# Patient Record
Sex: MALE
[De-identification: names, ages, dates, MRNs, and addresses within clinical notes are randomized per-mention and may not be internally consistent; named-entity substitution may affect disease eponyms.]

---

## 2018-08-06 ENCOUNTER — HOSPITAL ENCOUNTER (INPATIENT)
Dept: HOSPITAL 31 - C.ER | Age: 58
LOS: 3 days | Discharge: HOME | DRG: 541 | End: 2018-08-09
Attending: INTERNAL MEDICINE | Admitting: INTERNAL MEDICINE
Payer: COMMERCIAL

## 2018-08-06 DIAGNOSIS — J18.9: Primary | ICD-10-CM

## 2018-08-06 DIAGNOSIS — Z79.82: ICD-10-CM

## 2018-08-06 DIAGNOSIS — J44.0: ICD-10-CM

## 2018-08-06 DIAGNOSIS — N17.9: ICD-10-CM

## 2018-08-06 DIAGNOSIS — F17.210: ICD-10-CM

## 2018-08-06 DIAGNOSIS — J98.11: ICD-10-CM

## 2018-08-06 DIAGNOSIS — Z59.0: ICD-10-CM

## 2018-08-06 LAB
ALBUMIN SERPL-MCNC: 4.7 G/DL (ref 3.5–5)
ALBUMIN/GLOB SERPL: 1.4 {RATIO} (ref 1–2.1)
ALT SERPL-CCNC: 26 U/L (ref 21–72)
APTT BLD: 33 SECONDS (ref 21–34)
AST SERPL-CCNC: 21 U/L (ref 17–59)
BASOPHILS # BLD AUTO: 0.1 K/UL (ref 0–0.2)
BASOPHILS NFR BLD: 0.7 % (ref 0–2)
BNP SERPL-MCNC: 66.6 PG/ML (ref 0–900)
BUN SERPL-MCNC: 17 MG/DL (ref 9–20)
CALCIUM SERPL-MCNC: 10 MG/DL (ref 8.6–10.4)
CK MB SERPL-MCNC: 1.24 NG/ML (ref 0–3.38)
CK MB SERPL-MCNC: 1.38 NG/ML (ref 0–3.38)
CK MB SERPL-MCNC: 1.59 NG/ML (ref 0–3.38)
EOSINOPHIL # BLD AUTO: 0.1 K/UL (ref 0–0.7)
EOSINOPHIL NFR BLD: 1.1 % (ref 0–4)
ERYTHROCYTE [DISTWIDTH] IN BLOOD BY AUTOMATED COUNT: 13.7 % (ref 11.5–14.5)
GFR NON-AFRICAN AMERICAN: 42
HGB BLD-MCNC: 17 G/DL (ref 12–18)
INR PPP: 1.1
LYMPHOCYTES # BLD AUTO: 1.7 K/UL (ref 1–4.3)
LYMPHOCYTES NFR BLD AUTO: 13.8 % (ref 20–40)
MCH RBC QN AUTO: 30.9 PG (ref 27–31)
MCHC RBC AUTO-ENTMCNC: 34.3 G/DL (ref 33–37)
MCV RBC AUTO: 90.3 FL (ref 80–94)
MONOCYTES # BLD: 0.7 K/UL (ref 0–0.8)
MONOCYTES NFR BLD: 5.7 % (ref 0–10)
NEUTROPHILS # BLD: 9.6 K/UL (ref 1.8–7)
NEUTROPHILS NFR BLD AUTO: 78.7 % (ref 50–75)
NRBC BLD AUTO-RTO: 0.1 % (ref 0–2)
PLATELET # BLD: 248 K/UL (ref 130–400)
PMV BLD AUTO: 8.6 FL (ref 7.2–11.7)
PROTHROMBIN TIME: 11.9 SECONDS (ref 9.7–12.2)
RBC # BLD AUTO: 5.49 MIL/UL (ref 4.4–5.9)
TROPONIN I SERPL-MCNC: 0.04 NG/ML (ref 0–0.12)
TROPONIN I SERPL-MCNC: 0.04 NG/ML (ref 0–0.12)
TROPONIN I SERPL-MCNC: 0.08 NG/ML (ref 0–0.12)
WBC # BLD AUTO: 12.2 K/UL (ref 4.8–10.8)

## 2018-08-06 RX ADMIN — PANTOPRAZOLE SODIUM SCH MG: 40 TABLET, DELAYED RELEASE ORAL at 09:17

## 2018-08-06 RX ADMIN — IPRATROPIUM BROMIDE AND ALBUTEROL SULFATE SCH ML: .5; 3 SOLUTION RESPIRATORY (INHALATION) at 20:37

## 2018-08-06 RX ADMIN — ENOXAPARIN SODIUM SCH MG: 40 INJECTION SUBCUTANEOUS at 09:18

## 2018-08-06 SDOH — ECONOMIC STABILITY - HOUSING INSECURITY: HOMELESSNESS: Z59.0

## 2018-08-06 NOTE — CP.PCM.PN
Subjective





- Date & Time of Evaluation


Date of Evaluation: 08/06/18


Time of Evaluation: 11:00





- Subjective


Subjective: 


clinically same





Objective





- Vital Signs/Intake and Output


Vital Signs (last 24 hours): 


 











Temp Pulse Resp BP Pulse Ox


 


 97.7 F   58 L  20   96/61 L  94 L


 


 08/06/18 15:00  08/06/18 15:59  08/06/18 15:00  08/06/18 15:00  08/06/18 15:00








Intake and Output: 


 











 08/06/18 08/07/18





 18:59 06:59


 


Intake Total 300 


 


Output Total 300 


 


Balance 0 














- Medications


Medications: 


 Current Medications





Albuterol/Ipratropium (Duoneb 3 Mg/0.5 Mg (3 Ml) Ud)  3 ml INH RQ6 [facility]


Aspirin (Aspirin)  325 mg PO DAILY [facility]


   Last Admin: 08/06/18 09:17 Dose:  325 mg


Enoxaparin Sodium (Lovenox)  40 mg SC DAILY [facility]


   Last Admin: 08/06/18 09:18 Dose:  40 mg


Ceftriaxone Sodium 1 gm/ (Sodium Chloride)  100 mls @ 100 mls/hr IVPB DAILY [facility]


   PRN Reason: Protocol


   Last Admin: 08/06/18 09:17 Dose:  100 mls/hr


Azithromycin 500 mg/ Sodium (Chloride)  250 mls @ 250 mls/hr IVPB DAILY [facility]


   PRN Reason: Protocol


   Last Admin: 08/06/18 10:31 Dose:  250 mls/hr


Pantoprazole Sodium (Protonix Ec Tab)  40 mg PO DAILY [facility]


   Last Admin: 08/06/18 09:17 Dose:  40 mg











- Labs


Labs: 


 





 08/06/18 00:29 





 08/06/18 00:29 





 











PT  11.9 SECONDS (9.7-12.2)   08/06/18  00:25    


 


INR  1.1   08/06/18  00:25    


 


APTT  33 SECONDS (21-34)   08/06/18  00:25    














- Constitutional


Appears: Well





- Head Exam


Head Exam: ATRAUMATIC, NORMAL INSPECTION, NORMOCEPHALIC





- Eye Exam


Eye Exam: EOMI, Normal appearance, PERRL


Pupil Exam: NORMAL ACCOMODATION, PERRL





- ENT Exam


ENT Exam: Mucous Membranes Moist, Normal Exam





- Neck Exam


Neck Exam: Full ROM, Normal Inspection.  absent: Lymphadenopathy





- Respiratory Exam


Respiratory Exam: Decreased Breath Sounds





- Cardiovascular Exam


Cardiovascular Exam: REGULAR RHYTHM, +S1, +S2





- GI/Abdominal Exam


GI & Abdominal Exam: Soft, Diminished Bowel Sounds





- Rectal Exam


Rectal Exam: Deferred

## 2018-08-06 NOTE — CP.PCM.HP
Past Patient History





- Past Social History


Smoking Status: Light Smoker < 10 Cigarettes Daily





- MUSCULOSKELETAL/RHEUMATOLOGICAL


Hx Falls: No





- GASTROINTESTINAL


Hx Gall Bladder Disease: Yes (GB SURGERY 19978)





- PSYCHIATRIC


Hx Substance Use: No





- SURGICAL HISTORY


Hx Cholecystectomy: Yes





- ANESTHESIA


Hx Anesthesia: Yes


Hx Anesthesia Reactions: No





Meds


Allergies/Adverse Reactions: 


 Allergies











Allergy/AdvReac Type Severity Reaction Status Date / Time


 


No Known Allergies Allergy   Verified 08/06/18 00:17














Results





- Vital Signs


Recent Vital Signs: 





 Last Vital Signs











Temp  97.4 F L  08/06/18 07:15


 


Pulse  65   08/06/18 07:15


 


Resp  20   08/06/18 07:15


 


BP  111/56 L  08/06/18 07:15


 


Pulse Ox  97   08/06/18 07:15














- Labs


Result Diagrams: 


 08/06/18 00:29





 08/06/18 00:29


Labs: 





 Laboratory Results - last 24 hr











  08/06/18 08/06/18 08/06/18





  00:25 00:29 00:29


 


WBC   12.2 H 


 


RBC   5.49 


 


Hgb   17.0 


 


Hct   49.6 


 


MCV   90.3 


 


MCH   30.9 


 


MCHC   34.3 


 


RDW   13.7 


 


Plt Count   248 


 


MPV   8.6 


 


Neut % (Auto)   78.7 H 


 


Lymph % (Auto)   13.8 L 


 


Mono % (Auto)   5.7 


 


Eos % (Auto)   1.1 


 


Baso % (Auto)   0.7 


 


Neut # (Auto)   9.6 H 


 


Lymph # (Auto)   1.7 


 


Mono # (Auto)   0.7 


 


Eos # (Auto)   0.1 


 


Baso # (Auto)   0.1 


 


PT  11.9  


 


INR  1.1  


 


APTT  33  


 


D-Dimer, Quantitative   


 


Sodium    147


 


Potassium    3.6


 


Chloride    108 H


 


Carbon Dioxide    23


 


Anion Gap    19


 


BUN    17


 


Creatinine    1.7 H


 


Est GFR ( Amer)    50


 


Est GFR (Non-Af Amer)    42


 


Random Glucose    147 H


 


Calcium    10.0


 


Total Bilirubin    0.8


 


AST    21


 


ALT    26


 


Alkaline Phosphatase    95


 


Total Creatine Kinase    118


 


CK-MB (Mass)    1.24


 


Troponin I    0.0440


 


NT-Pro-B Natriuret Pep    66.6


 


Total Protein    8.0


 


Albumin    4.7


 


Globulin    3.3


 


Albumin/Globulin Ratio    1.4














  08/06/18





  01:27


 


WBC 


 


RBC 


 


Hgb 


 


Hct 


 


MCV 


 


MCH 


 


MCHC 


 


RDW 


 


Plt Count 


 


MPV 


 


Neut % (Auto) 


 


Lymph % (Auto) 


 


Mono % (Auto) 


 


Eos % (Auto) 


 


Baso % (Auto) 


 


Neut # (Auto) 


 


Lymph # (Auto) 


 


Mono # (Auto) 


 


Eos # (Auto) 


 


Baso # (Auto) 


 


PT 


 


INR 


 


APTT 


 


D-Dimer, Quantitative  < 200


 


Sodium 


 


Potassium 


 


Chloride 


 


Carbon Dioxide 


 


Anion Gap 


 


BUN 


 


Creatinine 


 


Est GFR ( Amer) 


 


Est GFR (Non-Af Amer) 


 


Random Glucose 


 


Calcium 


 


Total Bilirubin 


 


AST 


 


ALT 


 


Alkaline Phosphatase 


 


Total Creatine Kinase 


 


CK-MB (Mass) 


 


Troponin I 


 


NT-Pro-B Natriuret Pep 


 


Total Protein 


 


Albumin 


 


Globulin 


 


Albumin/Globulin Ratio 














Assessment & Plan





- Assessment and Plan (Free Text)


Plan: 





tomi


romix3


cardio


pulm


rocephin


zithromax


asp


as ordred

## 2018-08-06 NOTE — CT
Date of service: 



08/06/2018



PROCEDURE:  CT Chest with contrast (Pulmonary Angiogram)



HISTORY:

dyspnea, chest pain



COMPARISON:

None available.



TECHNIQUE:

Axial computed tomography images were obtained of the chest in the 

pulmonary arterial phase of enhancement. Coronal and sagittal 

reformatted images were created and reviewed.



Radiation dose:



Total exam DLP = 621 mGy-cm.



This CT exam was performed using one or more of the following dose 

reduction techniques: Automated exposure control, adjustment of the 

mA and/or kV according to patient size, and/or use of iterative 

reconstruction technique.



FINDINGS:



PULMONARY ARTERIES:

No evidence for central pulmonary embolism.  More limited evaluation 

for segmental and subsegmental emboli given motion artifact and 

suboptimal contrast timing bolus. 



AORTA:

No acute findings. No thoracic aortic aneurysm. 



LUNGS:

Atelectasis and or infiltrate at the lung bases.  Paraseptal 

emphysema. 3 millimeter subpleural nodular density along the fissure 

on series 4, image 62. 



PLEURAL SPACES:

Unremarkable. No effusion or pneumothorax. 



HEART:

Coronary calcifications. 



LYMPH NODES:

1.2 centimeter right hilar node.



BONES, CHEST WALL:

Unremarkable. No fracture or destructive lesion 



OTHER FINDINGS:

Calcified granulomas and/or foci in the liver. Cholecystectomy. 

Low-attenuation lesion in the left kidney measuring 3.5 centimeters 

demonstrating a Hounsfield unit attenuation of 6 suggestive for a 

cyst. Additional smaller hypodensities, too small to adequately 

characterize. 



IMPRESSION:

No evidence for central pulmonary embolism. More limited evaluation 

for segmental and subsegmental emboli given motion artifact and 

suboptimal contrast timing bolus. 



Atelectasis and or infiltrate at the lung bases. Paraseptal 

emphysema. 3 millimeter subpleural nodular density along the fissure 

on series 4, image 62.  Clinical correlation. Three month interval 

followup may be helpful if clinically indicated. 



Additional findings as above.



These findings were preliminarily reported at 2:37 a.m. on 08/06/2018 

by Dr. Juan Diego Rodriguez from [facility].

## 2018-08-06 NOTE — CP.PCM.PN
Subjective





- Date & Time of Evaluation


Date of Evaluation: 08/06/18


Time of Evaluation: 07:00





- Subjective


Subjective: 





PGY2- Progress Note for Dr. Gupta





Patient with no significant PMHx presented to the ER for midsternal and left 

sided chest pain which he rated 10/10. Pain lasted for about 30 minutes at that 

level and then decreased. Patient today says the pain is 5/10. Pain does not 

radiate. Patient does admit to some shortness of breath. Patient said he felt 

palpitations yesterday, but not today. Patient denies headache, abdominal pain, 

nausea, vomiting, constipation, or diarrhea. 





PMHx: none


Allergies: NKDA


Psurg: cholecystectomy 28 years ago, left leg surgery 30 years ago, left hand 

surgery 4 years ago


Famhx: Father- prostate CA, brother- leukemia


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


lives in shelter for 1 month, homeless for years 





Objective





- Vital Signs/Intake and Output


Vital Signs (last 24 hours): 


 











Temp Pulse Resp BP Pulse Ox


 


 97.4 F L  62   20   111/56 L  97 


 


 08/06/18 07:15  08/06/18 08:50  08/06/18 07:15  08/06/18 07:15  08/06/18 07:15








Intake and Output: 


 











 08/06/18 08/06/18





 06:59 18:59


 


Intake Total  300


 


Output Total  300


 


Balance  0














- Medications


Medications: 


 Current Medications





Albuterol/Ipratropium (Duoneb 3 Mg/0.5 Mg (3 Ml) Ud)  [facility]


Aspirin (Aspirin)  [facility] PO [facility]ILY [facility]


   Last Admin: 08/06/18 09:17 Dose:  325 mg


Enoxaparin Sodium (Lovenox)  40 mg SC [facility]ILY [facility]


   Last Admin: 08/06/18 09:18 Dose:  40 mg


Ceftriaxone Sodium 1 gm/ (Sodium Chloride)  100 mls @ 100 mls/hr IVPB DAILY [facility]


   PRN Reason: Protocol


   Last Admin: 08/06/18 09:17 Dose:  100 mls/hr


Azithromycin 500 mg/ Sodium (Chloride)  250 mls @ 250 mls/hr IVPB DAILY [facility]


   PRN Reason: Protocol


   Last Admin: 08/06/18 10:31 Dose:  250 mls/hr


Pantoprazole Sodium (Protonix Ec Tab)  40 mg PO DAILY [facility]


   Last Admin: 08/06/18 09:17 Dose:  40 mg











- Labs


Labs: 


 





 08/06/18 00:29 





 08/06/18 00:29 





 











PT  11.9 SECONDS (9.7-12.2)   08/06/18  00:25    


 


INR  1.1   08/06/18  00:25    


 


APTT  33 SECONDS (21-34)   08/06/18  00:25    














- Constitutional


Appears: Non-toxic, No Acute Distress, Unkempt





- Head Exam


Head Exam: ATRAUMATIC, NORMAL INSPECTION, NORMOCEPHALIC





- Eye Exam


Eye Exam: EOMI, Normal appearance





- ENT Exam


ENT Exam: Mucous Membranes Moist





- Respiratory Exam


Respiratory Exam: Rales, NORMAL BREATHING PATTERN





- Cardiovascular Exam


Cardiovascular Exam: REGULAR RHYTHM, RRR, +S1, +S2





- GI/Abdominal Exam


GI & Abdominal Exam: Soft, Normal Bowel Sounds.  absent: Tenderness





- Extremities Exam


Extremities Exam: Normal Inspection.  absent: Pedal Edema, Tenderness





- Back Exam


Back Exam: NORMAL INSPECTION





- Neurological Exam


Neurological Exam: Alert, Awake, Oriented x3





- Psychiatric Exam


Psychiatric exam: Normal Affect, Normal Mood





- Skin


Skin Exam: Intact, Normal Color, Warm





Assessment and Plan





- Assessment and Plan (Free Text)


Assessment: 


Chest Pain


r/o ACS


admit to tele 


Trop I .0440, .0830


f/u 3rd troponin


EKG: no ST or T wave changes


cardiology consult, Dr. Pérez, help appreciated


heart healthy diet 


CTA: no evidence of PE. more limited eval for segmental and subsegmental emboli 

given motion artifact and suboptimal contrast timing bolus. Atelectasis and or 

infiltrate at the lung bases. Paraseptal emphysema. 3mm subpleural nodular 

density along the fissure. Clinical correlation. Three month interval followup 

may be helpful if clinically indicated. 





Pneumonia


as seen on CT


pulm consulted, Dr. Alexandre, help appreciated


WBC: 12.2 


Meds:


Azithromycin 500mg ivpb daily


Ceftriaxone 1 gm ivpb daily


Duonebs 3ml INH q6h





Lung Nodule 


as seen on CT


will need follow up CT as an outpatient


pulm consulted, Dr. Alexandre, help appreciated 





Prophylaxis


Protonix 40mg po daily


Lovenox 40mg sc daily, SCDs





Discussed with Dr. Gupta

## 2018-08-06 NOTE — C.PDOC
History Of Present Illness


58 y/o M c no PMHx p/w chest pain x 2 hours. Pain is midchest, radiating 

towards right, sharp, intermittent, worsened by deep breath, associated with 

shortness of breath, started while patient was working as a . Denies 

fever, cough, trauma, travel, leg swelling, nausea, vomiting.


Time Seen by Provider: 08/06/18 00:25


Chief Complaint (Nursing): Chest Pain





Past Medical History


Vital Signs: 


 Last Vital Signs











Temp  98.5 F   08/06/18 00:13


 


Pulse  81   08/06/18 01:36


 


Resp  13   08/06/18 01:36


 


BP  107/69   08/06/18 01:36


 


Pulse Ox  97   08/06/18 01:36














- Medical History


PMH: Gall Bladder Disease


Surgical History: Cholecystectomy


Family History: States: No Known Family Hx





- Social History


Hx Alcohol Use: No


Hx Substance Use: No





- Immunization History


Hx Tetanus Toxoid Vaccination: Yes


Hx Influenza Vaccination: Yes


Hx Pneumococcal Vaccination: Yes





Review Of Systems


Except As Marked, All Systems Reviewed And Found Negative.


Constitutional: Negative for: Fever


Gastrointestinal: Negative for: Vomiting





Physical Exam





- Physical Exam


Additional Physical Exam Comments: 





Gen: NAD


Head: NC/AT


Eyes: PERRL


ENT: MMM


Neck: Supple


Chest: No tenderness


CV: Borderline tachycardic


Lungs: CTA b/l. Pulse oximetry 91% on room air


Abd: Soft, NT


Back: No CVA tenderness


Extremities: No edema


Skin: No rash. Diaphoretic.


Neuro: Alert, no focal deficit





ED Course And Treatment





- Laboratory Results


Result Diagrams: 


 08/06/18 00:29





 08/06/18 00:29


O2 Sat by Pulse Oximetry: 92





Medical Decision Making


Medical Decision Making: 


Differential includes MSK pain, PE, ACS. 





FINDINGS:


Pulmonary arteries: Normal. No pulmonary emboli.


Aorta: Normal. No aortic aneurysm. No aortic dissection.


Lungs: Atelectasis or infiltrate in the bases. Paraseptal emphysema


Pleural space: Normal. No pneumothorax. No pleural effusion.


Heart: Coronary artery calcifications.


Bones/joints: Unremarkable. No acute fracture.


Soft tissues: Unremarkable.


Lymph nodes: Unremarkable. No enlarged lymph nodes.


Liver: Calcified granulomas in the liver.


Gallbladder and bile ducts: Cholecystectomy.


Kidneys and ureters: Low-density structures in the left kidney are not further 

characterized.





Troponin negative at 0.04. Will trend.





Antibiotics administered.





Disposition





- Disposition


Disposition: HOSPITALIZED


Disposition Time: 02:38


Condition: FAIR


Forms:  [facility] (English)





- POA


Core Measure Indicators: Pneumonia





- Clinical Impression


Clinical Impression: 


 Chest pain, Pneumonia

## 2018-08-06 NOTE — CP.PCM.CON
History of Present Illness





- History of Present Illness


History of Present Illness: 





CHART REVIEWED , PT SEEN AND EXAMINED


56 YO W MALE WITH A HX PUD, SMOKING, ?COPD, ADM 8/6/18 WITH INCREASED ANT MOD 

CP X 20 MIN NONRADIATING WHILE AT WORK, COLLECTING GARBAGE., +COUGH +WHITE 

SPUTUM X 5 DAYS. NO WHEEZE., STILL SMOKING. NO N/V. NO DIAPHORESIS. QUIT 

COCAINE YRS AGO. ON NO MEDS. 





Review of Systems





- Review of Systems


All systems: reviewed and no additional remarkable complaints except





- Constitutional


Constitutional: absent: Chills, Excessive Sweating





- EENT


Eyes: absent: Change in Vision


Ears: absent: Dizziness


Nose/Mouth/Throat: absent: Nasal Congestion





- Cardiovascular


Cardiovascular: Chest Pain.  absent: Leg Edema





- Respiratory


Respiratory: Cough, Dyspnea.  absent: Excessive Mucous Production





- Gastrointestinal


Gastrointestinal: Heartburn.  absent: Nausea, Vomiting





- Genitourinary


Genitourinary: absent: Difficulty Urinating





- Musculoskeletal


Musculoskeletal: absent: Arthralgias





- Integumentary


Integumentary: absent: Rash





- Neurological


Neurological: absent: Confusion, Focal Weakness





- Psychiatric


Psychiatric: absent: Anxiety





- Endocrine


Endocrine: absent: Palpitations





- Hematologic/Lymphatic


Hematologic: absent: Easy Bruising





Past Patient History





- Infectious Disease


Hx of Infectious Diseases: None





- Past Medical History & Family History


Past Medical History?: No


Past Family History: Reviewed and not pertinent





- Past Social History


Smoking Status: Light Smoker < 10 Cigarettes Daily


Chewing Tobacco Use: No


Cigar Use: No


Alcohol: None


Drugs: Cocaine





- CARDIAC


Hx Cardiac Disorders: No





- PULMONARY


Hx Respiratory Disorders: No





- NEUROLOGICAL


Hx Neurological Disorder: No





- HEENT


Hx HEENT Problems: No





- RENAL


Hx Chronic Kidney Disease: No





- ENDOCRINE/METABOLIC


Hx Endocrine Disorders: No





- HEMATOLOGICAL/ONCOLOGICAL


Hx Blood Disorders: No





- INTEGUMENTARY


Hx Dermatological Problems: No





- MUSCULOSKELETAL/RHEUMATOLOGICAL


Hx Musculoskeletal Disorders: No


Hx Falls: No





- GASTROINTESTINAL


Hx Gall Bladder Disease: Yes (GB SURGERY 19978)


Hx Gastritis: Yes





- GENITOURINARY/GYNECOLOGICAL


Hx Genitourinary Disorders: No





- PSYCHIATRIC


Hx Psychophysiologic Disorder: No


Hx Substance Use: No





- SURGICAL HISTORY


Hx Cholecystectomy: Yes





- ANESTHESIA


Hx Anesthesia: Yes


Hx Anesthesia Reactions: No





Meds


Allergies/Adverse Reactions: 


 Allergies











Allergy/AdvReac Type Severity Reaction Status Date / Time


 


No Known Allergies Allergy   Verified 08/06/18 00:17














- Medications


Medications: 


 Current Medications





Albuterol/Ipratropium (Duoneb 3 Mg/0.5 Mg (3 Ml) Ud)  3 ml INH RQ6 [facility]


Aspirin (Aspirin)  325 mg PO DAILY [facility]


Enoxaparin Sodium (Lovenox)  40 mg SC DAILY [facility]


Ceftriaxone Sodium 1 gm/ (Sodium Chloride)  100 mls @ 100 mls/hr IVPB DAILY [facility]


   PRN Reason: Protocol


Azithromycin 500 mg/ Sodium (Chloride)  250 mls @ 250 mls/hr IVPB DAILY [facility]


   PRN Reason: Protocol


Ceftriaxone Sodium 1 gm/ (Sodium Chloride)  100 mls @ 100 mls/hr IVPB DAILY [facility]


   PRN Reason: Protocol


Pantoprazole Sodium (Protonix Ec Tab)  40 mg PO DAILY [facility]











Physical Exam





- Constitutional


Appears: No Acute Distress





- Head Exam


Head Exam: ATRAUMATIC, NORMOCEPHALIC





- Eye Exam


Eye Exam: EOMI, Normal appearance





- ENT Exam


ENT Exam: Mucous Membranes Moist





- Respiratory Exam


Respiratory Exam: Decreased Breath Sounds.  absent: Accessory Muscle Use, Chest 

Wall Tenderness, Wheezes





- Cardiovascular Exam


Cardiovascular Exam: RRR, +S1, +S2





- GI/Abdominal Exam


GI & Abdominal Exam: Soft.  absent: Tenderness





- Rectal Exam


Rectal Exam: Deferred





- Extremities Exam


Extremities exam: Negative for: calf tenderness, pedal edema





- Back Exam


Back exam: absent: CVA tenderness (L), CVA tenderness (R)





- Neurological Exam


Neurological exam: Alert, CN II-XII Intact, Oriented x3





- Psychiatric Exam


Psychiatric exam: Normal Mood





Results





- Vital Signs


Recent Vital Signs: 


 Last Vital Signs











Temp  97.4 F L  08/06/18 07:15


 


Pulse  65   08/06/18 07:15


 


Resp  20   08/06/18 07:15


 


BP  111/56 L  08/06/18 07:15


 


Pulse Ox  97   08/06/18 07:15














- Labs


Result Diagrams: 


 08/06/18 00:29





 08/06/18 00:29


Labs: 


 Laboratory Results - last 24 hr











  08/06/18 08/06/18 08/06/18





  00:25 00:29 00:29


 


WBC   12.2 H 


 


RBC   5.49 


 


Hgb   17.0 


 


Hct   49.6 


 


MCV   90.3 


 


MCH   30.9 


 


MCHC   34.3 


 


RDW   13.7 


 


Plt Count   248 


 


MPV   8.6 


 


Neut % (Auto)   78.7 H 


 


Lymph % (Auto)   13.8 L 


 


Mono % (Auto)   5.7 


 


Eos % (Auto)   1.1 


 


Baso % (Auto)   0.7 


 


Neut # (Auto)   9.6 H 


 


Lymph # (Auto)   1.7 


 


Mono # (Auto)   0.7 


 


Eos # (Auto)   0.1 


 


Baso # (Auto)   0.1 


 


PT  11.9  


 


INR  1.1  


 


APTT  33  


 


D-Dimer, Quantitative   


 


Sodium    147


 


Potassium    3.6


 


Chloride    108 H


 


Carbon Dioxide    23


 


Anion Gap    19


 


BUN    17


 


Creatinine    1.7 H


 


Est GFR ( Amer)    50


 


Est GFR (Non-Af Amer)    42


 


Random Glucose    147 H


 


Calcium    10.0


 


Total Bilirubin    0.8


 


AST    21


 


ALT    26


 


Alkaline Phosphatase    95


 


Total Creatine Kinase    118


 


CK-MB (Mass)    1.24


 


Troponin I    0.0440


 


NT-Pro-B Natriuret Pep    66.6


 


Total Protein    8.0


 


Albumin    4.7


 


Globulin    3.3


 


Albumin/Globulin Ratio    1.4














  08/06/18





  01:27


 


WBC 


 


RBC 


 


Hgb 


 


Hct 


 


MCV 


 


MCH 


 


MCHC 


 


RDW 


 


Plt Count 


 


MPV 


 


Neut % (Auto) 


 


Lymph % (Auto) 


 


Mono % (Auto) 


 


Eos % (Auto) 


 


Baso % (Auto) 


 


Neut # (Auto) 


 


Lymph # (Auto) 


 


Mono # (Auto) 


 


Eos # (Auto) 


 


Baso # (Auto) 


 


PT 


 


INR 


 


APTT 


 


D-Dimer, Quantitative  < 200


 


Sodium 


 


Potassium 


 


Chloride 


 


Carbon Dioxide 


 


Anion Gap 


 


BUN 


 


Creatinine 


 


Est GFR ( Amer) 


 


Est GFR (Non-Af Amer) 


 


Random Glucose 


 


Calcium 


 


Total Bilirubin 


 


AST 


 


ALT 


 


Alkaline Phosphatase 


 


Total Creatine Kinase 


 


CK-MB (Mass) 


 


Troponin I 


 


NT-Pro-B Natriuret Pep 


 


Total Protein 


 


Albumin 


 


Globulin 


 


Albumin/Globulin Ratio 














Assessment & Plan


(1) Acute kidney injury


Status: Acute   





(2) Smoking


Status: Acute   





(3) Chest pain


Status: Acute   





(4) Pneumonia


Status: Acute   





- Assessment and Plan (Free Text)


Assessment: 





56 YO MALE WITH A HX SMOKING, ADM WITH ?UNSTABLE ANGINA R/O ACS WITH DYSPNEA, ?

COPD EXAC AND PNA. CONT EMPIRIC AB., NEB BD., PULM TOILET., MONITOR O2 SAT. CTA 

REVIEWED, NEG PE +BIBASILAR INFILT. MONITOR CARD ENZ, FOR ECHO. CARDIO EVAL. 

SMOKING CESSATION. GI/DVT PROPHYLAXIS. DISCUSSED WITH STAFF.

## 2018-08-07 LAB
ALBUMIN SERPL-MCNC: 3.5 G/DL (ref 3.5–5)
ALBUMIN/GLOB SERPL: 1.4 {RATIO} (ref 1–2.1)
ALT SERPL-CCNC: 21 U/L (ref 21–72)
AST SERPL-CCNC: 15 U/L (ref 17–59)
BASOPHILS # BLD AUTO: 0 K/UL (ref 0–0.2)
BASOPHILS NFR BLD: 0.6 % (ref 0–2)
BUN SERPL-MCNC: 16 MG/DL (ref 9–20)
CALCIUM SERPL-MCNC: 8.3 MG/DL (ref 8.6–10.4)
CK MB SERPL-MCNC: 0.94 NG/ML (ref 0–3.38)
EOSINOPHIL # BLD AUTO: 0.2 K/UL (ref 0–0.7)
EOSINOPHIL NFR BLD: 3.1 % (ref 0–4)
ERYTHROCYTE [DISTWIDTH] IN BLOOD BY AUTOMATED COUNT: 13.6 % (ref 11.5–14.5)
GFR NON-AFRICAN AMERICAN: > 60
HGB BLD-MCNC: 14.4 G/DL (ref 12–18)
LYMPHOCYTES # BLD AUTO: 2.5 K/UL (ref 1–4.3)
LYMPHOCYTES NFR BLD AUTO: 37.3 % (ref 20–40)
MCH RBC QN AUTO: 30.7 PG (ref 27–31)
MCHC RBC AUTO-ENTMCNC: 33.9 G/DL (ref 33–37)
MCV RBC AUTO: 90.6 FL (ref 80–94)
MONOCYTES # BLD: 0.5 K/UL (ref 0–0.8)
MONOCYTES NFR BLD: 7.5 % (ref 0–10)
NEUTROPHILS # BLD: 3.4 K/UL (ref 1.8–7)
NEUTROPHILS NFR BLD AUTO: 51.5 % (ref 50–75)
NRBC BLD AUTO-RTO: 0.1 % (ref 0–2)
PLATELET # BLD: 168 K/UL (ref 130–400)
PMV BLD AUTO: 8.5 FL (ref 7.2–11.7)
RBC # BLD AUTO: 4.69 MIL/UL (ref 4.4–5.9)
WBC # BLD AUTO: 6.7 K/UL (ref 4.8–10.8)

## 2018-08-07 RX ADMIN — IPRATROPIUM BROMIDE AND ALBUTEROL SULFATE SCH ML: .5; 3 SOLUTION RESPIRATORY (INHALATION) at 13:38

## 2018-08-07 RX ADMIN — IPRATROPIUM BROMIDE AND ALBUTEROL SULFATE SCH ML: .5; 3 SOLUTION RESPIRATORY (INHALATION) at 20:30

## 2018-08-07 RX ADMIN — IPRATROPIUM BROMIDE AND ALBUTEROL SULFATE SCH: .5; 3 SOLUTION RESPIRATORY (INHALATION) at 03:04

## 2018-08-07 RX ADMIN — ENOXAPARIN SODIUM SCH MG: 40 INJECTION SUBCUTANEOUS at 09:24

## 2018-08-07 RX ADMIN — IPRATROPIUM BROMIDE AND ALBUTEROL SULFATE SCH ML: .5; 3 SOLUTION RESPIRATORY (INHALATION) at 07:35

## 2018-08-07 RX ADMIN — PANTOPRAZOLE SODIUM SCH MG: 40 TABLET, DELAYED RELEASE ORAL at 09:24

## 2018-08-07 NOTE — CP.PCM.PN
Subjective





- Date & Time of Evaluation


Date of Evaluation: 08/07/18


Time of Evaluation: 19:25





- Subjective


Subjective: 





PGY2- Progress Note for Dr. Gupta





Patient seen and examined at bedside today. Per nursing, no acute events 

occurred overnight. Patient reports feeling better upon examination. Patient no 

longer reports chest pain .Patient denies any shortness of breath, headache, 

dizziness, changes in vision, syncopal episodes, or any other complaints.





Objective





- Vital Signs/Intake and Output


Vital Signs (last 24 hours): 


 











Temp Pulse Resp BP Pulse Ox


 


 97.9 F   58 L  20   112/73   96 


 


 08/07/18 15:29  08/07/18 16:15  08/07/18 15:29  08/07/18 15:29  08/07/18 15:29








Intake and Output: 


 











 08/07/18 08/08/18





 18:59 06:59


 


Intake Total 200 


 


Output Total 220 


 


Balance -20 














- Medications


Medications: 


 Current Medications





Albuterol/Ipratropium (Duoneb 3 Mg/0.5 Mg (3 Ml) Ud)  3 ml INH RQ6 [facility]


   Last Admin: 08/07/18 13:38 Dose:  3 ml


Aspirin (Aspirin)  325 mg PO DAILY [facility]


   Last Admin: 08/07/18 09:24 Dose:  325 mg


Enoxaparin Sodium (Lovenox)  40 mg SC DAILY [facility]


   Last Admin: 08/07/18 09:24 Dose:  40 mg


Ceftriaxone Sodium 1 gm/ (Sodium Chloride)  100 mls @ 100 mls/hr IVPB DAILY [facility]


   PRN Reason: Protocol


   Last Admin: 08/07/18 09:23 Dose:  100 mls/hr


Azithromycin 500 mg/ Sodium (Chloride)  250 mls @ 250 mls/hr IVPB DAILY [facility]


   PRN Reason: Protocol


   Last Admin: 08/07/18 10:33 Dose:  250 mls/hr


Pantoprazole Sodium (Protonix Ec Tab)  40 mg PO DAILY [facility]


   Last Admin: 08/07/18 09:24 Dose:  40 mg











- Labs


Labs: 


 





 08/07/18 07:18 





 08/07/18 07:18 





 











PT  11.9 SECONDS (9.7-12.2)   08/06/18  00:25    


 


INR  1.1   08/06/18  00:25    


 


APTT  33 SECONDS (21-34)   08/06/18  00:25    














- Head Exam


Head Exam: ATRAUMATIC, NORMAL INSPECTION, NORMOCEPHALIC





- Eye Exam


Eye Exam: EOMI, Normal appearance, PERRL.  absent: Periorbital tenderness


Pupil Exam: NORMAL ACCOMODATION, PERRL





- ENT Exam


ENT Exam: Mucous Membranes Moist, Normal Oropharynx





- Respiratory Exam


Respiratory Exam: Clear to Ausculation Bilateral, NORMAL BREATHING PATTERN.  

absent: Prolonged Expiratory Phase, Respiratory Distress





- Cardiovascular Exam


Cardiovascular Exam: REGULAR RHYTHM, +S1, +S2





- GI/Abdominal Exam


GI & Abdominal Exam: Soft, Normal Bowel Sounds.  absent: Hyperactive Bowel 

Sounds





- Extremities Exam


Extremities Exam: Full ROM, Normal Inspection.  absent: Pedal Edema





- Back Exam


Back Exam: NORMAL INSPECTION.  absent: CVA tenderness (R), paraspinal tenderness





- Neurological Exam


Neurological Exam: Alert, Awake





- Psychiatric Exam


Psychiatric exam: Normal Affect, Normal Mood





- Skin


Skin Exam: Dry, Intact





Assessment and Plan





- Assessment and Plan (Free Text)


Plan: 





r/o ACS


admit to tele 


Trop I .0440, .0830, .03


f/u 3rd troponin


EKG: no ST or T wave changes


UDS: negative


cardiology consult, Dr. Pérez, help appreciated


heart healthy diet 


Echocardiogram taken. Will f/u with final read.


CTA: no evidence of PE. more limited eval for segmental and subsegmental emboli 

given motion artifact and suboptimal contrast timing bolus. Atelectasis and or 

infiltrate at the lung bases. Paraseptal emphysema. 3mm subpleural nodular 

density along the fissure. Clinical correlation. Three month interval followup 

may be helpful if clinically indicated. 


Meds:


Aspirin 325mg PO Daily





Pneumonia


as seen on CT


pulm consulted, Dr. Alexandre, help appreciated


   :RESP STATUS NO SIG CHANGE. CONT EMPIRIC AB., CONT NEB BD., MONITOR O2 SAT. 

CT CHEST REVIEWED., CARDIO W/U IN PROGRESS. INCREASE                       :

OOB. DISCUSSED WITH STAFF. 


WBC: 12.2 upon admission


Blood cultures x24 hrs (Preliminary)


Meds:


Azithromycin 500mg ivpb daily


Ceftriaxone 1 gm ivpb daily


Duonebs 3ml INH q6h





Lung Nodule 


as seen on CT


will need follow up CT as an outpatient


pulm consulted, Dr. Alexandre, help appreciated 





Prophylaxis


Protonix 40mg po daily


Lovenox 40mg sc daily, SCDs





Discussed with Dr. Gupta

## 2018-08-07 NOTE — CP.PCM.PN
Subjective





- Date & Time of Evaluation


Date of Evaluation: 08/07/18


Time of Evaluation: 11:40





- Subjective


Subjective: 


clinically same





Objective





- Vital Signs/Intake and Output


Vital Signs (last 24 hours): 


 











Temp Pulse Resp BP Pulse Ox


 


 97.9 F   58 L  20   112/73   96 


 


 08/07/18 15:29  08/07/18 16:15  08/07/18 15:29  08/07/18 15:29  08/07/18 15:29








Intake and Output: 


 











 08/07/18 08/08/18





 18:59 06:59


 


Intake Total 200 


 


Output Total 220 


 


Balance -20 














- Medications


Medications: 


 Current Medications





Albuterol/Ipratropium (Duoneb 3 Mg/0.5 Mg (3 Ml) Ud)  [facility]


   Last Admin: 08/07/18 20:30 Dose:  3 ml


Aspirin (Aspirin)  325 mg PO [facility]ILY [facility]


   Last Admin: 08/07/18 09:24 Dose:  325 mg


Enoxaparin Sodium (Lovenox)  40 mg SC [facility]ILY [facility]


   Last Admin: 08/07/18 09:24 Dose:  40 mg


Ceftriaxone Sodium 1 gm/ (Sodium Chloride)  100 mls @ 100 mls/hr IVPB [facility]


   PRN Reason: Protocol


   Last Admin: 08/07/18 09:23 Dose:  100 mls/hr


Azithromycin 500 mg/ Sodium (Chloride)  250 mls @ 250 mls/hr IVPB [facility]


   PRN Reason: Protocol


   Last Admin: 08/07/18 10:33 Dose:  250 mls/hr


Pantoprazole Sodium (Protonix Ec Tab)  40 mg PO [facility]ILY [facility]


   Last Admin: 08/07/18 09:24 Dose:  40 mg











- Labs


Labs: 


 





 08/07/18 07:18 





 08/07/18 07:18 





 











PT  11.9 SECONDS (9.7-12.2)   08/06/18  00:25    


 


INR  1.1   08/06/18  00:25    


 


APTT  33 SECONDS (21-34)   08/06/18  00:25    














- Constitutional


Appears: Well





- Head Exam


Head Exam: ATRAUMATIC, NORMAL INSPECTION, NORMOCEPHALIC





- Eye Exam


Eye Exam: EOMI, Normal appearance, PERRL


Pupil Exam: NORMAL ACCOMODATION, PERRL





- ENT Exam


ENT Exam: Mucous Membranes Moist, Normal Exam





- Neck Exam


Neck Exam: Full ROM, Normal Inspection.  absent: Lymphadenopathy





- Respiratory Exam


Respiratory Exam: Decreased Breath Sounds





- Cardiovascular Exam


Cardiovascular Exam: REGULAR RHYTHM, +S1, +S2





- GI/Abdominal Exam


GI & Abdominal Exam: Soft, Diminished Bowel Sounds





- Rectal Exam


Rectal Exam: Deferred

## 2018-08-07 NOTE — CP.PCM.PN
Subjective





- Date & Time of Evaluation


Date of Evaluation: 08/07/18


Time of Evaluation: 10:05





- Subjective


Subjective: 





PT ALERT, +COUGH, +YELLOW SPUTUM., STILL INTERMITTENT  ANT. CP AT REST.  ROS; 

OTHERWISE NEG. 





Objective





- Vital Signs/Intake and Output


Vital Signs (last 24 hours): 


 











Temp Pulse Resp BP Pulse Ox


 


 97.7 F   55 L  18   115/73   100 


 


 08/07/18 07:20  08/07/18 07:20  08/07/18 07:20  08/07/18 07:20  08/07/18 07:20








Intake and Output: 


 











 08/07/18 08/07/18





 06:59 18:59


 


Intake Total  200


 


Output Total  220


 


Balance  -20














- Medications


Medications: 


 Current Medications





Albuterol/Ipratropium (Duoneb 3 Mg/0.5 Mg (3 Ml) Ud)  3 ml INH RQ6 [facility]


   Last Admin: 08/07/18 07:35 Dose:  3 ml


Aspirin (Aspirin)  325 mg PO DAILY [facility]


   Last Admin: 08/07/18 09:24 Dose:  325 mg


Enoxaparin Sodium (Lovenox)  40 mg SC DAILY [facility]


   Last Admin: 08/07/18 09:24 Dose:  40 mg


Ceftriaxone Sodium 1 gm/ (Sodium Chloride)  100 mls @ 100 mls/hr IVPB DAILY [facility]


   PRN Reason: Protocol


   Last Admin: 08/07/18 09:23 Dose:  100 mls/hr


Azithromycin 500 mg/ Sodium (Chloride)  250 mls @ 250 mls/hr IVPB DAILY [facility]


   PRN Reason: Protocol


   Last Admin: 08/06/18 10:31 Dose:  250 mls/hr


Pantoprazole Sodium (Protonix Ec Tab)  40 mg PO DAILY [facility]


   Last Admin: 08/07/18 09:24 Dose:  40 mg











- Labs


Labs: 


 





 08/07/18 07:18 





 08/07/18 07:18 





 











PT  11.9 SECONDS (9.7-12.2)   08/06/18  00:25    


 


INR  1.1   08/06/18  00:25    


 


APTT  33 SECONDS (21-34)   08/06/18  00:25    














- Constitutional


Appears: Non-toxic, No Acute Distress





- Head Exam


Head Exam: ATRAUMATIC, NORMOCEPHALIC





- Eye Exam


Eye Exam: EOMI, Normal appearance





- ENT Exam


ENT Exam: Mucous Membranes Moist





- Neck Exam


Neck Exam: Normal Inspection





- Respiratory Exam


Respiratory Exam: Decreased Breath Sounds.  absent: Accessory Muscle Use, 

Wheezes, Respiratory Distress





- Cardiovascular Exam


Cardiovascular Exam: RRR, +S1, +S2





- GI/Abdominal Exam


GI & Abdominal Exam: Soft.  absent: Tenderness





- Rectal Exam


Rectal Exam: Deferred





- Extremities Exam


Extremities Exam: absent: Calf Tenderness, Pedal Edema





- Back Exam


Back Exam: absent: CVA tenderness (L), CVA tenderness (R)





- Neurological Exam


Neurological Exam: Alert, Awake, CN II-XII Intact, Oriented x3





- Psychiatric Exam


Psychiatric exam: Normal Mood





- Skin


Skin Exam: absent: Rash





Assessment and Plan


(1) Acute kidney injury


Status: Acute   





(2) Smoking


Status: Acute   





(3) Chest pain


Status: Acute   





(4) Pneumonia


Status: Acute   





- Assessment and Plan (Free Text)


Assessment: 





RESP STATUS NO SIG CHANGE. CONT EMPIRIC AB., CONT NEB BD., MONITOR O2 SAT. CT 

CHEST REVIEWED., CARDIO W/U IN PROGRESS. INCREASE OOB. DISCUSSED WITH STAFF.

## 2018-08-08 LAB
ALBUMIN SERPL-MCNC: 3.7 G/DL (ref 3.5–5)
ALBUMIN/GLOB SERPL: 1.3 {RATIO} (ref 1–2.1)
ALT SERPL-CCNC: 23 U/L (ref 21–72)
AST SERPL-CCNC: 19 U/L (ref 17–59)
BASOPHILS # BLD AUTO: 0 K/UL (ref 0–0.2)
BASOPHILS NFR BLD: 0.4 % (ref 0–2)
BUN SERPL-MCNC: 16 MG/DL (ref 9–20)
CALCIUM SERPL-MCNC: 8.7 MG/DL (ref 8.6–10.4)
EOSINOPHIL # BLD AUTO: 0.2 K/UL (ref 0–0.7)
EOSINOPHIL NFR BLD: 2.7 % (ref 0–4)
ERYTHROCYTE [DISTWIDTH] IN BLOOD BY AUTOMATED COUNT: 13.4 % (ref 11.5–14.5)
GFR NON-AFRICAN AMERICAN: 57
HGB BLD-MCNC: 14.9 G/DL (ref 12–18)
LYMPHOCYTES # BLD AUTO: 2.1 K/UL (ref 1–4.3)
LYMPHOCYTES NFR BLD AUTO: 31 % (ref 20–40)
MCH RBC QN AUTO: 31.5 PG (ref 27–31)
MCHC RBC AUTO-ENTMCNC: 34.7 G/DL (ref 33–37)
MCV RBC AUTO: 90.8 FL (ref 80–94)
MONOCYTES # BLD: 0.5 K/UL (ref 0–0.8)
MONOCYTES NFR BLD: 7.5 % (ref 0–10)
NEUTROPHILS # BLD: 3.9 K/UL (ref 1.8–7)
NEUTROPHILS NFR BLD AUTO: 58.4 % (ref 50–75)
NRBC BLD AUTO-RTO: 0.1 % (ref 0–2)
PLATELET # BLD: 169 K/UL (ref 130–400)
PMV BLD AUTO: 8.6 FL (ref 7.2–11.7)
RBC # BLD AUTO: 4.72 MIL/UL (ref 4.4–5.9)
WBC # BLD AUTO: 6.7 K/UL (ref 4.8–10.8)

## 2018-08-08 RX ADMIN — IPRATROPIUM BROMIDE AND ALBUTEROL SULFATE SCH ML: .5; 3 SOLUTION RESPIRATORY (INHALATION) at 01:15

## 2018-08-08 RX ADMIN — ENOXAPARIN SODIUM SCH MG: 40 INJECTION SUBCUTANEOUS at 09:20

## 2018-08-08 RX ADMIN — IPRATROPIUM BROMIDE AND ALBUTEROL SULFATE SCH ML: .5; 3 SOLUTION RESPIRATORY (INHALATION) at 13:34

## 2018-08-08 RX ADMIN — IPRATROPIUM BROMIDE AND ALBUTEROL SULFATE SCH ML: .5; 3 SOLUTION RESPIRATORY (INHALATION) at 19:38

## 2018-08-08 RX ADMIN — PANTOPRAZOLE SODIUM SCH MG: 40 TABLET, DELAYED RELEASE ORAL at 09:20

## 2018-08-08 RX ADMIN — IPRATROPIUM BROMIDE AND ALBUTEROL SULFATE SCH ML: .5; 3 SOLUTION RESPIRATORY (INHALATION) at 07:29

## 2018-08-08 NOTE — CP.PCM.PN
Subjective





- Date & Time of Evaluation


Date of Evaluation: 08/08/18


Time of Evaluation: 07:00





- Subjective


Subjective: 





PGY2- Progress Note for Dr. Gupta





Patient seen and examined at bedside. Patient says he still has mild left sided 

chest pain. Patient denies any shortness of breath, headache, dizziness, 

changes in vision, syncopal episodes, or any other complaints.





Objective





- Vital Signs/Intake and Output


Vital Signs (last 24 hours): 


 











Temp Pulse Resp BP Pulse Ox


 


 97.6 F   59 L  20   113/71   98 


 


 08/08/18 07:20  08/08/18 07:55  08/08/18 07:20  08/08/18 07:20  08/08/18 07:20











- Medications


Medications: 


 Current Medications





Albuterol/Ipratropium (Duoneb 3 Mg/0.5 Mg (3 Ml) Ud)  3 ml INH RQ6 [facility]


   Last Admin: 08/08/18 07:29 Dose:  3 ml


Aspirin (Aspirin)  325 mg PO DAILY [facility]


   Last Admin: 08/08/18 09:20 Dose:  325 mg


Enoxaparin Sodium (Lovenox)  40 mg SC DAILY [facility]


   Last Admin: 08/08/18 09:20 Dose:  40 mg


Ceftriaxone Sodium 1 gm/ (Sodium Chloride)  100 mls @ 100 mls/hr IVPB DAILY [facility]


   PRN Reason: Protocol


   Last Admin: 08/08/18 09:20 Dose:  100 mls/hr


Azithromycin 500 mg/ Sodium (Chloride)  250 mls @ 250 mls/hr IVPB DAILY [facility]


   PRN Reason: Protocol


   Last Admin: 08/07/18 10:33 Dose:  250 mls/hr


Pantoprazole Sodium (Protonix Ec Tab)  40 mg PO DAILY [facility]


   Last Admin: 08/08/18 09:20 Dose:  40 mg











- Labs


Labs: 


 





 08/08/18 07:41 





 08/08/18 07:41 





 











PT  11.9 SECONDS (9.7-12.2)   08/06/18  00:25    


 


INR  1.1   08/06/18  00:25    


 


APTT  33 SECONDS (21-34)   08/06/18  00:25    














- Additional Findings


Additional findings: 


- Head Exam


Head Exam: ATRAUMATIC, NORMAL INSPECTION, NORMOCEPHALIC





- Eye Exam


Eye Exam: EOMI, Normal appearance, PERRL.  absent: Periorbital tenderness


Pupil Exam: NORMAL ACCOMODATION, PERRL





- ENT Exam


ENT Exam: Mucous Membranes Moist, Normal Oropharynx





- Respiratory Exam


Respiratory Exam: Clear to Ausculation Bilateral, NORMAL BREATHING PATTERN.  

absent: Prolonged Expiratory Phase, Respiratory Distress





- Cardiovascular Exam


Cardiovascular Exam: REGULAR RHYTHM, +S1, +S2





- GI/Abdominal Exam


GI & Abdominal Exam: Soft, Normal Bowel Sounds.  absent: Hyperactive Bowel 

Sounds





- Extremities Exam


Extremities Exam: Full ROM, Normal Inspection.  absent: Pedal Edema





- Back Exam


Back Exam: NORMAL INSPECTION.  absent: CVA tenderness (R), paraspinal tenderness





- Neurological Exam


Neurological Exam: Alert, Awake





- Psychiatric Exam


Psychiatric exam: Normal Affect, Normal Mood





- Skin


Skin Exam: Dry, Intact











Assessment and Plan





- Assessment and Plan (Free Text)


Assessment: 


r/o ACS


admit to tele 


Trop I .0440, .0830, .03





EKG: no ST or T wave changes


UDS: negative


cardiology consult, Dr. Pérez, help appreciated


heart healthy diet 


Echo: LVEF is 55%, no aortic regurg, mitral regurg is trace, no tricuspid valve 

regurg, no pulmonic valve regurg 


CTA: no evidence of PE. more limited eval for segmental and subsegmental emboli 

given motion artifact and suboptimal contrast timing bolus. Atelectasis and or 

infiltrate at the lung bases. Paraseptal emphysema. 3mm subpleural nodular 

density along the fissure. Clinical correlation. Three month interval followup 

may be helpful if clinically indicated. 


Meds:


Aspirin 325mg PO Daily





Pneumonia


as seen on CT


pulm consulted, Dr. Alexandre, help appreciated


   :RESP STATUS NO SIG CHANGE. CONT EMPIRIC AB., CONT NEB BD., MONITOR O2 SAT. 

CT CHEST REVIEWED., CARDIO W/U IN PROGRESS. INCREASE                       :

OOB. DISCUSSED WITH STAFF. 


WBC: 12.2 upon admission


Blood cultures x24 hrs (Preliminary)


Meds:


Azithromycin 500mg ivpb daily


Ceftriaxone 1 gm ivpb daily


Duonebs 3ml INH q6h





Lung Nodule 


as seen on CT


will need follow up CT as an outpatient


pulm consulted, Dr. Alexandre, help appreciated 





Prophylaxis


Protonix 40mg po daily


Lovenox 40mg sc daily, SCDs





Discussed with Dr. Gupta

## 2018-08-08 NOTE — CARD
--------------- APPROVED REPORT --------------





Date of service: 08/07/2018



EXAM: Two-dimensional and M-mode echocardiogram with Doppler and 

color Doppler.



INDICATION

Chest Pain Pneumonia



2D DIMENSIONS 

IVSd1.0   (0.7-1.1cm)LVDd3.8   (3.9-5.9cm)

PWd0.9   (0.7-1.1cm)LVDs2.8   (2.5-4.0cm)

FS (%) 28.1   %LVEF (%)55.0   (>50%)



M-Mode DIMENSIONS 

Left Atrium (MM)3.72   (2.5-4.0cm)IVSd1.02   (0.7-1.1cm)

Aortic Root3.51   (2.2-3.7cm)LVDd4.51   (4.0-5.6cm)

Aortic Cusp Exc.2.44   (1.5-2.0cm)PWd0.95   (0.7-1.1cm)

FS (%) 34   %LVDs2.97   (2.0-3.8cm)

LVEF (%)63   (>50%)



Mitral Valve

MV E Pdptqjya93.3cm/sMV A Wigxxuqd48.3cm/sE/A ratio1.0



TDI

E/Lateral E'0.0E/Medial E'0.0



 LEFT VENTRICLE 

The left ventricle is normal size.

There is normal left ventricular wall thickness.

Left ventricle systolic function is normal.

The Ejection Fraction is >55%.

There is normal LV segmental wall motion.

The left ventricular diastolic function is normal.



 RIGHT VENTRICLE 

The right ventricle is normal size.

There is normal right ventricular wall thickness.

The right ventricular systolic function is normal.



 ATRIA 

The left atrium size is normal.

The right atrium size is normal.

The interatrial septum is intact with no evidence for an atrial 

septal defect.



 AORTIC VALVE 

The aortic valve is normal in structure.

No aortic regurgitation is present.

There is no aortic valvular stenosis. 

There is no aortic valvular vegetation.



 MITRAL VALVE 

The mitral valve is normal in structure.

There is no evidence of mitral valve prolapse.

There is no mitral valve stenosis.

Mitral regurgitation is trace.



 TRICUSPID VALVE 

The tricuspid valve is normal in structure.

There is no tricuspid valve regurgitation noted.

There is no tricuspid valve prolapse or vegetation.

There is no tricuspid valve stenosis. 



 PULMONIC VALVE 

The pulmonic valve is not well visualized.

There is no pulmonic valvular regurgitation. 

There is no pulmonic valvular stenosis.



 GREAT VESSELS 

The aortic root is normal in size.



 PERICARDIAL EFFUSION 

There is no significant pericardial effusion.



<Conclusion>

Left ventricle systolic function is normal.

The Ejection Fraction is >55%.

No aortic regurgitation is present.

Mitral regurgitation is trace.

There is no tricuspid valve regurgitation noted.

There is no pulmonic valvular regurgitation.

## 2018-08-08 NOTE — CP.PCM.PN
Subjective





- Date & Time of Evaluation


Date of Evaluation: 08/08/18


Time of Evaluation: 09:52





- Subjective


Subjective: 





PT FEELS BETTER., LESS COUGH, LESS CP., AMBULATED, NO SOB.  ROS; OTHERWISE NEG. 





Objective





- Vital Signs/Intake and Output


Vital Signs (last 24 hours): 


 











Temp Pulse Resp BP Pulse Ox


 


 97.6 F   59 L  20   113/71   98 


 


 08/08/18 07:20  08/08/18 07:55  08/08/18 07:20  08/08/18 07:20  08/08/18 07:20











- Medications


Medications: 


 Current Medications





Albuterol/Ipratropium (Duoneb 3 Mg/0.5 Mg (3 Ml) Ud)  3 ml INH RQ6 [facility]


   Last Admin: 08/08/18 07:29 Dose:  3 ml


Aspirin (Aspirin)  325 mg PO DAILY [facility]


   Last Admin: 08/08/18 09:20 Dose:  325 mg


Enoxaparin Sodium (Lovenox)  40 mg SC DAILY [facility]


   Last Admin: 08/08/18 09:20 Dose:  40 mg


Ceftriaxone Sodium 1 gm/ (Sodium Chloride)  100 mls @ 100 mls/hr IVPB DAILY [facility]


   PRN Reason: Protocol


   Last Admin: 08/08/18 09:20 Dose:  100 mls/hr


Azithromycin 500 mg/ Sodium (Chloride)  250 mls @ 250 mls/hr IVPB DAILY [facility]


   PRN Reason: Protocol


   Last Admin: 08/07/18 10:33 Dose:  250 mls/hr


Pantoprazole Sodium (Protonix Ec Tab)  40 mg PO DAILY [facility]


   Last Admin: 08/08/18 09:20 Dose:  40 mg











- Labs


Labs: 


 





 08/08/18 07:41 





 08/08/18 07:41 





 











PT  11.9 SECONDS (9.7-12.2)   08/06/18  00:25    


 


INR  1.1   08/06/18  00:25    


 


APTT  33 SECONDS (21-34)   08/06/18  00:25    














- Constitutional


Appears: Non-toxic, No Acute Distress





- Head Exam


Head Exam: ATRAUMATIC, NORMOCEPHALIC





- Eye Exam


Eye Exam: EOMI, Normal appearance





- ENT Exam


ENT Exam: Mucous Membranes Moist





- Respiratory Exam


Respiratory Exam: Decreased Breath Sounds.  absent: Wheezes, Respiratory 

Distress





- Cardiovascular Exam


Cardiovascular Exam: RRR, +S1, +S2





- GI/Abdominal Exam


GI & Abdominal Exam: Soft.  absent: Tenderness





- Rectal Exam


Rectal Exam: Deferred





- Extremities Exam


Extremities Exam: absent: Calf Tenderness, Pedal Edema





- Neurological Exam


Neurological Exam: Alert, Awake, CN II-XII Intact, Oriented x3





- Psychiatric Exam


Psychiatric exam: Normal Mood





- Skin


Skin Exam: absent: Rash





Assessment and Plan


(1) Acute kidney injury


Status: Acute   





(2) Smoking


Status: Acute   





(3) Chest pain


Status: Acute   





(4) Pneumonia


Status: Acute   





- Assessment and Plan (Free Text)


Assessment: 





RESP STATUS IMPROVING., CONT PULM TOILET WITH NEB BD., AFEBRILE ON AB., 

INCREASE OOB. PFT'S AS OUPT. CXR RVIEWED. DISCUSSED WITH STAFF.

## 2018-08-08 NOTE — CP.PCM.PN
Subjective





- Date & Time of Evaluation


Date of Evaluation: 08/08/18


Time of Evaluation: 12:00





- Subjective


Subjective: 


clinically same





Objective





- Vital Signs/Intake and Output


Vital Signs (last 24 hours): 


 











Temp Pulse Resp BP Pulse Ox


 


 97.6 F   59 L  20   113/71   98 


 


 08/08/18 07:20  08/08/18 07:55  08/08/18 07:20  08/08/18 07:20  08/08/18 07:20











- Medications


Medications: 


 Current Medications





Albuterol/Ipratropium (Duoneb 3 Mg/0.5 Mg (3 Ml) Ud)  3 ml INH RQ6 [facility]


   Last Admin: 08/08/18 07:29 Dose:  3 ml


Aspirin (Aspirin)  325 mg PO DAILY [facility]


   Last Admin: 08/08/18 09:20 Dose:  325 mg


Enoxaparin Sodium (Lovenox)  40 mg SC DAILY [facility]


   Last Admin: 08/08/18 09:20 Dose:  40 mg


Ceftriaxone Sodium 1 gm/ (Sodium Chloride)  100 mls @ 100 mls/hr IVPB DAILY [facility]


   PRN Reason: Protocol


   Last Admin: 08/08/18 09:20 Dose:  100 mls/hr


Azithromycin 500 mg/ Sodium (Chloride)  250 mls @ 250 mls/hr IVPB DAILY [facility]


   PRN Reason: Protocol


   Last Admin: 08/08/18 10:37 Dose:  250 mls/hr


Pantoprazole Sodium (Protonix Ec Tab)  40 mg PO DAILY [facility]


   Last Admin: 08/08/18 09:20 Dose:  40 mg











- Labs


Labs: 


 





 08/08/18 07:41 





 08/08/18 07:41 





 











PT  11.9 SECONDS (9.7-12.2)   08/06/18  00:25    


 


INR  1.1   08/06/18  00:25    


 


APTT  33 SECONDS (21-34)   08/06/18  00:25

## 2018-08-09 VITALS
DIASTOLIC BLOOD PRESSURE: 77 MMHG | TEMPERATURE: 97.7 F | SYSTOLIC BLOOD PRESSURE: 126 MMHG | RESPIRATION RATE: 20 BRPM | OXYGEN SATURATION: 98 %

## 2018-08-09 VITALS — HEART RATE: 65 BPM

## 2018-08-09 LAB
ALBUMIN SERPL-MCNC: 3.8 G/DL (ref 3.5–5)
ALBUMIN/GLOB SERPL: 1.5 {RATIO} (ref 1–2.1)
ALT SERPL-CCNC: 19 U/L (ref 21–72)
AST SERPL-CCNC: 19 U/L (ref 17–59)
BASOPHILS # BLD AUTO: 0 K/UL (ref 0–0.2)
BASOPHILS NFR BLD: 0.5 % (ref 0–2)
BUN SERPL-MCNC: 13 MG/DL (ref 9–20)
CALCIUM SERPL-MCNC: 8.5 MG/DL (ref 8.6–10.4)
EOSINOPHIL # BLD AUTO: 0.2 K/UL (ref 0–0.7)
EOSINOPHIL NFR BLD: 2.8 % (ref 0–4)
ERYTHROCYTE [DISTWIDTH] IN BLOOD BY AUTOMATED COUNT: 13.8 % (ref 11.5–14.5)
GFR NON-AFRICAN AMERICAN: > 60
HGB BLD-MCNC: 14.9 G/DL (ref 12–18)
LYMPHOCYTES # BLD AUTO: 2.2 K/UL (ref 1–4.3)
LYMPHOCYTES NFR BLD AUTO: 27.9 % (ref 20–40)
MCH RBC QN AUTO: 31.2 PG (ref 27–31)
MCHC RBC AUTO-ENTMCNC: 34.6 G/DL (ref 33–37)
MCV RBC AUTO: 90.2 FL (ref 80–94)
MONOCYTES # BLD: 0.5 K/UL (ref 0–0.8)
MONOCYTES NFR BLD: 6.1 % (ref 0–10)
NEUTROPHILS # BLD: 4.9 K/UL (ref 1.8–7)
NEUTROPHILS NFR BLD AUTO: 62.7 % (ref 50–75)
NRBC BLD AUTO-RTO: 0.1 % (ref 0–2)
PLATELET # BLD: 174 K/UL (ref 130–400)
PMV BLD AUTO: 8.6 FL (ref 7.2–11.7)
RBC # BLD AUTO: 4.77 MIL/UL (ref 4.4–5.9)
WBC # BLD AUTO: 7.8 K/UL (ref 4.8–10.8)

## 2018-08-09 RX ADMIN — IPRATROPIUM BROMIDE AND ALBUTEROL SULFATE SCH ML: .5; 3 SOLUTION RESPIRATORY (INHALATION) at 01:19

## 2018-08-09 RX ADMIN — PANTOPRAZOLE SODIUM SCH MG: 40 TABLET, DELAYED RELEASE ORAL at 09:40

## 2018-08-09 RX ADMIN — IPRATROPIUM BROMIDE AND ALBUTEROL SULFATE SCH ML: .5; 3 SOLUTION RESPIRATORY (INHALATION) at 14:04

## 2018-08-09 RX ADMIN — ENOXAPARIN SODIUM SCH MG: 40 INJECTION SUBCUTANEOUS at 09:40

## 2018-08-09 RX ADMIN — IPRATROPIUM BROMIDE AND ALBUTEROL SULFATE SCH ML: .5; 3 SOLUTION RESPIRATORY (INHALATION) at 07:32

## 2018-08-09 NOTE — CP.PCM.PN
Subjective





- Date & Time of Evaluation


Date of Evaluation: 08/09/18


Time of Evaluation: 12:39





- Subjective


Subjective: 





PT ALERT, LESS COUGH., LESS SOB., LESS CP.  ROS; OTHERWISE NEG.





Objective





- Vital Signs/Intake and Output


Vital Signs (last 24 hours): 


 











Temp Pulse Resp BP Pulse Ox


 


 98.3 F   57 L  18   105/66   97 


 


 08/09/18 07:20  08/09/18 07:57  08/09/18 07:20  08/09/18 07:20  08/09/18 07:20











- Medications


Medications: 


 Current Medications





Albuterol/Ipratropium (Duoneb 3 Mg/0.5 Mg (3 Ml) Ud)  3 ml INH RQ6 [facility]


   Last Admin: 08/09/18 07:32 Dose:  3 ml


Aspirin (Aspirin)  325 mg PO DAILY [facility]


   Last Admin: 08/09/18 09:40 Dose:  325 mg


Enoxaparin Sodium (Lovenox)  40 mg SC DAILY [facility]


   Last Admin: 08/09/18 09:40 Dose:  40 mg


Ceftriaxone Sodium 1 gm/ (Sodium Chloride)  100 mls @ 100 mls/hr IVPB DAILY [facility]


   PRN Reason: Protocol


   Last Admin: 08/09/18 09:41 Dose:  100 mls/hr


Azithromycin 500 mg/ Sodium (Chloride)  250 mls @ 250 mls/hr IVPB DAILY [facility]


   PRN Reason: Protocol


   Last Admin: 08/09/18 11:11 Dose:  250 mls/hr


Pantoprazole Sodium (Protonix Ec Tab)  40 mg PO DAILY [facility]


   Last Admin: 08/09/18 09:40 Dose:  40 mg











- Labs


Labs: 


 





 08/09/18 06:21 





 08/09/18 06:21 





 











PT  11.9 SECONDS (9.7-12.2)   08/06/18  00:25    


 


INR  1.1   08/06/18  00:25    


 


APTT  33 SECONDS (21-34)   08/06/18  00:25    














- Constitutional


Appears: Non-toxic, No Acute Distress





- Head Exam


Head Exam: ATRAUMATIC, NORMOCEPHALIC





- Eye Exam


Eye Exam: EOMI, Normal appearance





- ENT Exam


ENT Exam: Mucous Membranes Moist





- Neck Exam


Neck Exam: Normal Inspection





- Respiratory Exam


Respiratory Exam: Decreased Breath Sounds.  absent: Wheezes, Respiratory 

Distress





- Cardiovascular Exam


Cardiovascular Exam: RRR, +S1, +S2





- GI/Abdominal Exam


GI & Abdominal Exam: Soft.  absent: Tenderness





- Rectal Exam


Rectal Exam: Deferred





- Extremities Exam


Extremities Exam: absent: Calf Tenderness, Pedal Edema





- Back Exam


Back Exam: absent: CVA tenderness (L), CVA tenderness (R)





- Neurological Exam


Neurological Exam: Alert, Awake, CN II-XII Intact, Oriented x3





- Psychiatric Exam


Psychiatric exam: Normal Mood





- Skin


Skin Exam: absent: Rash





Assessment and Plan


(1) Acute kidney injury


Status: Acute   





(2) Smoking


Status: Acute   





(3) Chest pain


Status: Acute   





(4) Pneumonia


Status: Acute   





- Assessment and Plan (Free Text)


Assessment: 





RESP STATUS IMPROVING., CONT PULM TOILET., NEB BD. MONITOR O2 SAT. CXR 

REVIEWED. AFEBRILE ON AB. INCREASE OOB. DISCUSSED WITH STAFF AND PMD.

## 2018-08-09 NOTE — CP.PCM.PN
Subjective





- Date & Time of Evaluation


Date of Evaluation: 08/09/18


Time of Evaluation: 10:40





- Subjective


Subjective: 


clinically same





Objective





- Vital Signs/Intake and Output


Vital Signs (last 24 hours): 


 











Temp Pulse Resp BP Pulse Ox


 


 97.7 F   65   20   126/77   98 


 


 08/09/18 15:30  08/09/18 17:00  08/09/18 15:30  08/09/18 15:30  08/09/18 15:30











- Medications


Medications: 


 Current Medications





Albuterol/Ipratropium (Duoneb 3 Mg/0.5 Mg (3 Ml) Ud)  3 ml INH RQ6 [facility]


   Last Admin: 08/09/18 14:04 Dose:  3 ml


Aspirin (Aspirin)  325 mg PO DAILY [facility]


   Last Admin: 08/09/18 09:40 Dose:  325 mg


Enoxaparin Sodium (Lovenox)  40 mg SC DAILY [facility]


   Last Admin: 08/09/18 09:40 Dose:  40 mg


Ceftriaxone Sodium 1 gm/ (Sodium Chloride)  100 mls @ 100 mls/hr IVPB DAILY [facility]


   PRN Reason: Protocol


   Last Admin: 08/09/18 09:41 Dose:  100 mls/hr


Azithromycin 500 mg/ Sodium (Chloride)  250 mls @ 250 mls/hr IVPB DAILY [facility]


   PRN Reason: Protocol


   Last Admin: 08/09/18 11:11 Dose:  250 mls/hr


Pantoprazole Sodium (Protonix Ec Tab)  40 mg PO DAILY [facility]


   Last Admin: 08/09/18 09:40 Dose:  40 mg











- Labs


Labs: 


 





 08/09/18 06:21 





 08/09/18 06:21 





 











PT  11.9 SECONDS (9.7-12.2)   08/06/18  00:25    


 


INR  1.1   08/06/18  00:25    


 


APTT  33 SECONDS (21-34)   08/06/18  00:25    














- Constitutional


Appears: Well





- Head Exam


Head Exam: ATRAUMATIC, NORMAL INSPECTION, NORMOCEPHALIC





- Eye Exam


Eye Exam: EOMI, Normal appearance, PERRL


Pupil Exam: NORMAL ACCOMODATION, PERRL





- ENT Exam


ENT Exam: Mucous Membranes Moist, Normal Exam





- Neck Exam


Neck Exam: Full ROM, Normal Inspection.  absent: Lymphadenopathy





- Respiratory Exam


Respiratory Exam: Decreased Breath Sounds





- Cardiovascular Exam


Cardiovascular Exam: REGULAR RHYTHM, +S1, +S2





- GI/Abdominal Exam


GI & Abdominal Exam: Soft, Diminished Bowel Sounds





- Rectal Exam


Rectal Exam: Deferred

## 2018-08-09 NOTE — CP.PCM.PN
Subjective





- Date & Time of Evaluation


Date of Evaluation: 08/09/18


Time of Evaluation: 07:00





- Subjective


Subjective: 


PGY2- Progress Note for Dr. Gupta





Patient seen and examined at bedside. Patient says his chest pain has resolved. 

Patient denies any shortness of breath, headache, dizziness, changes in vision, 

syncopal episodes, or any other complaints.











Objective





- Vital Signs/Intake and Output


Vital Signs (last 24 hours): 


 











Temp Pulse Resp BP Pulse Ox


 


 98.3 F   57 L  18   105/66   97 


 


 08/09/18 07:20  08/09/18 07:57  08/09/18 07:20  08/09/18 07:20  08/09/18 07:20











- Medications


Medications: 


 Current Medications





Albuterol/Ipratropium (Duoneb 3 Mg/0.5 Mg (3 Ml) Ud)  3 ml INH RQ6 [facility]


   Last Admin: 08/09/18 14:04 Dose:  3 ml


Aspirin (Aspirin)  325 mg PO DAILY [facility]


   Last Admin: 08/09/18 09:40 Dose:  325 mg


Enoxaparin Sodium (Lovenox)  40 mg SC DAILY [facility]


   Last Admin: 08/09/18 09:40 Dose:  40 mg


Ceftriaxone Sodium 1 gm/ (Sodium Chloride)  100 mls @ 100 mls/hr IVPB DAILY [facility]


   PRN Reason: Protocol


   Last Admin: 08/09/18 09:41 Dose:  100 mls/hr


Azithromycin 500 mg/ Sodium (Chloride)  250 mls @ 250 mls/hr IVPB DAILY [facility]


   PRN Reason: Protocol


   Last Admin: 08/09/18 11:11 Dose:  250 mls/hr


Pantoprazole Sodium (Protonix Ec Tab)  40 mg PO DAILY [facility]


   Last Admin: 08/09/18 09:40 Dose:  40 mg











- Labs


Labs: 


 





 08/09/18 06:21 





 08/09/18 06:21 





 











PT  11.9 SECONDS (9.7-12.2)   08/06/18  00:25    


 


INR  1.1   08/06/18  00:25    


 


APTT  33 SECONDS (21-34)   08/06/18  00:25    














- Additional Findings


Additional findings: 





- Head Exam


Head Exam: ATRAUMATIC, NORMAL INSPECTION, NORMOCEPHALIC





- Eye Exam


Eye Exam: EOMI, Normal appearance, PERRL.  absent: Periorbital tenderness


Pupil Exam: NORMAL ACCOMODATION, PERRL





- ENT Exam


ENT Exam: Mucous Membranes Moist, Normal Oropharynx





- Respiratory Exam


Respiratory Exam: Clear to Ausculation Bilateral, NORMAL BREATHING PATTERN.  

absent: Prolonged Expiratory Phase, Respiratory Distress





- Cardiovascular Exam


Cardiovascular Exam: REGULAR RHYTHM, +S1, +S2





- GI/Abdominal Exam


GI & Abdominal Exam: Soft, Normal Bowel Sounds.  absent: Hyperactive Bowel 

Sounds





- Extremities Exam


Extremities Exam: Full ROM, Normal Inspection.  absent: Pedal Edema





- Back Exam


Back Exam: NORMAL INSPECTION.  absent: CVA tenderness (R), paraspinal tenderness





- Neurological Exam


Neurological Exam: Alert, Awake





- Psychiatric Exam


Psychiatric exam: Normal Affect, Normal Mood





- Skin


Skin Exam: Dry, Intact








Assessment and Plan





- Assessment and Plan (Free Text)


Assessment: 





r/o ACS


admit to tele 


Trop I .0440, .0830, .03





EKG: no ST or T wave changes


UDS: negative


cardiology consult, Dr. Pérez, help appreciated


heart healthy diet 


Echo: LVEF is 55%, no aortic regurg, mitral regurg is trace, no tricuspid valve 

regurg, no pulmonic valve regurg 


CTA: no evidence of PE. more limited eval for segmental and subsegmental emboli 

given motion artifact and suboptimal contrast timing bolus. Atelectasis and or 

infiltrate at the lung bases. Paraseptal emphysema. 3mm subpleural nodular 

density along the fissure. Clinical correlation. Three month interval followup 

may be helpful if clinically indicated. 


Meds:


Aspirin 325mg PO Daily





Pneumonia


as seen on CT


pulm consulted, Dr. Alexandre, help appreciated


   :RESP STATUS NO SIG CHANGE. CONT EMPIRIC AB., CONT NEB BD., MONITOR O2 SAT. 

CT CHEST REVIEWED., CARDIO W/U IN PROGRESS. INCREASE                       :

OOB. DISCUSSED WITH STAFF. 


WBC: 12.2 upon admission


Blood cultures negative


Meds:


Azithromycin 500mg ivpb daily


Ceftriaxone 1 gm ivpb daily


Duonebs 3ml INH q6h





Lung Nodule 


as seen on CT


will need follow up CT as an outpatient


pulm consulted, Dr. Alexandre, help appreciated 





Prophylaxis


Protonix 40mg po daily


Lovenox 40mg sc daily, SCDs





Discussed with Dr. Gupta








Patient stable for discharge as per Dr. Gupta. 


Patient to take Augmentin twice a day for 7 days. 


Patient to see primary care doctor in 1 week.  Patient to follow up with Dr. Cardona (heart doctor) within 2 weeks.


Patient to return to ER if symptoms return. 


Patient explained instructions who understands and agrees.

## 2018-08-09 NOTE — CARD
--------------- APPROVED REPORT --------------





Date of service: 08/06/2018



EKG Measurement

Heart [id number]

MA [id number]

[id number]

[id number]

[id number]



<Conclusion>

Sinus bradycardia

Otherwise normal ECG

## 2018-08-09 NOTE — CP.PCM.CON
History of Present Illness





- History of Present Illness


History of Present Illness: 





CC:  Chest pain 





HPI:  57 year old man admitted to [facility] for COPD exacerbation.  He is 

reporting chest pain.  Duration is 20 min, located in the left chest.  Pain is 

sharp and pressure like in character, Pain started >10 days ago.  Pain occurs 

in a random context without exertion.  





Review of Systems





- Review of Systems


All systems: reviewed and no additional remarkable complaints except





Past Patient History





- Infectious Disease


Hx of Infectious Diseases: None





- Past Medical History & Family History


Past Medical History?: No


Past Family History: Reviewed and not pertinent





- Past Social History


Smoking Status: Light Smoker < 10 Cigarettes Daily


Chewing Tobacco Use: No


Cigar Use: No


Alcohol: None


Drugs: Cocaine





- CARDIAC


Hx Cardiac Disorders: No





- PULMONARY


Hx Respiratory Disorders: No





- NEUROLOGICAL


Hx Neurological Disorder: No





- HEENT


Hx HEENT Problems: No





- RENAL


Hx Chronic Kidney Disease: No





- ENDOCRINE/METABOLIC


Hx Endocrine Disorders: No





- HEMATOLOGICAL/ONCOLOGICAL


Hx Blood Disorders: No





- INTEGUMENTARY


Hx Dermatological Problems: No





- MUSCULOSKELETAL/RHEUMATOLOGICAL


Hx Musculoskeletal Disorders: No


Hx Falls: No





- GASTROINTESTINAL


Hx Gall Bladder Disease: Yes (GB SURGERY 19978)


Hx Gastritis: Yes





- GENITOURINARY/GYNECOLOGICAL


Hx Genitourinary Disorders: No





- PSYCHIATRIC


Hx Psychophysiologic Disorder: No


Hx Substance Use: No





- SURGICAL HISTORY


Hx Cholecystectomy: Yes





- ANESTHESIA


Hx Anesthesia: Yes


Hx Anesthesia Reactions: No





Meds


Allergies/Adverse Reactions: 


 Allergies











Allergy/AdvReac Type Severity Reaction Status Date / Time


 


No Known Allergies Allergy   Verified 08/06/18 00:17














- Medications


Medications: 


 Current Medications





Albuterol/Ipratropium (Duoneb 3 Mg/0.5 Mg (3 Ml) Ud)  3 ml INH RQ6 [facility]


   Last Admin: 08/09/18 07:32 Dose:  3 ml


Aspirin (Aspirin)  325 mg PO DAILY [facility]


   Last Admin: 08/09/18 09:40 Dose:  325 mg


Enoxaparin Sodium (Lovenox)  40 mg SC DAILY [facility]


   Last Admin: 08/09/18 09:40 Dose:  40 mg


Ceftriaxone Sodium 1 gm/ (Sodium Chloride)  100 mls @ 100 mls/hr IVPB DAILY [facility]


   PRN Reason: Protocol


   Last Admin: 08/09/18 09:41 Dose:  100 mls/hr


Azithromycin 500 mg/ Sodium (Chloride)  250 mls @ 250 mls/hr IVPB DAILY [facility]


   PRN Reason: Protocol


   Last Admin: 08/08/18 10:37 Dose:  250 mls/hr


Pantoprazole Sodium (Protonix Ec Tab)  40 mg PO DAILY [facility]


   Last Admin: 08/09/18 09:40 Dose:  40 mg











Physical Exam





- Constitutional


Appears: Well, Non-toxic





- Head Exam


Head Exam: ATRAUMATIC, NORMAL INSPECTION





- Eye Exam


Eye Exam: PERRL.  absent: Scleral icterus





- ENT Exam


ENT Exam: Mucous Membranes Moist


Additional comments: 





Poor dentition





- Neck Exam


Neck exam: Negative for: Lymphadenopathy, Thyromegaly





- Respiratory Exam


Respiratory Exam: Clear to Auscultation Bilateral, NORMAL BREATHING PATTERN





- Cardiovascular Exam


Cardiovascular Exam: REGULAR RHYTHM, RRR, +S1, +S2.  absent: JVD





- GI/Abdominal Exam


GI & Abdominal Exam: Normal Bowel Sounds.  absent: Organomegaly





- Extremities Exam


Extremities exam: Positive for: pedal pulses present.  Negative for: calf 

tenderness, pedal edema





- Neurological Exam


Neurological exam: CN II-XII Intact, Oriented x3





- Psychiatric Exam


Psychiatric exam: Normal Affect, Normal Mood





Results





- Vital Signs


Recent Vital Signs: 


 Last Vital Signs











Temp  98.3 F   08/09/18 07:20


 


Pulse  57 L  08/09/18 07:57


 


Resp  18   08/09/18 07:20


 


BP  105/66   08/09/18 07:20


 


Pulse Ox  97   08/09/18 07:20














- Labs


Result Diagrams: 


 08/09/18 06:21





 08/09/18 06:21


Labs: 


 Laboratory Results - last 24 hr











  08/09/18 08/09/18





  06:21 06:21


 


WBC  7.8 


 


RBC  4.77 


 


Hgb  14.9 


 


Hct  43.1 


 


MCV  90.2 


 


MCH  31.2 H 


 


MCHC  34.6 


 


RDW  13.8 


 


Plt Count  174 


 


MPV  8.6 


 


Neut % (Auto)  62.7 


 


Lymph % (Auto)  27.9 


 


Mono % (Auto)  6.1 


 


Eos % (Auto)  2.8 


 


Baso % (Auto)  0.5 


 


Neut # (Auto)  4.9 


 


Lymph # (Auto)  2.2 


 


Mono # (Auto)  0.5 


 


Eos # (Auto)  0.2 


 


Baso # (Auto)  0.0 


 


Sodium   140


 


Potassium   3.9


 


Chloride   105


 


Carbon Dioxide   25


 


Anion Gap   14


 


BUN   13


 


Creatinine   1.1


 


Est GFR ( Amer)   > 60


 


Est GFR (Non-Af Amer)   > 60


 


Random Glucose   78


 


Calcium   8.5 L


 


Total Bilirubin   0.4


 


AST   19


 


ALT   19 L


 


Alkaline Phosphatase   65


 


Total Protein   6.3


 


Albumin   3.8


 


Globulin   2.6


 


Albumin/Globulin Ratio   1.5














- EKG Data


EKG Interpreted by: Myself


EKG shows normal: Sinus rhythm





- Imaging and Cardiology


  ** CT scan - chest


Additional comment: 





Intersitial lung disease





Assessment & Plan





- Assessment and Plan (Free Text)


Assessment: 





57 year old man with COPD exacerbation on abx and bronchodilators


Chest pain serial trop is negative, EKG is negative for ischemia so unlikely to 

be MI


Tobacco abuse discussed for 10 minutes regarding the ill effects of nicotine on 

vascular function appears to be precontemplative.  





If he is d/c home then he should follow up in my office in 1-2 weeks to reasses 

his symptoms.

## 2018-08-10 NOTE — CARD
--------------- APPROVED REPORT --------------





Date of service: 08/06/2018



EKG Measurement

Heart [id number]

ND [id number]

[id number]

[id number]

[id number]



<Conclusion>

Sinus tachycardia

Possible Left atrial enlargement

Borderline ECG